# Patient Record
Sex: MALE | Race: WHITE | NOT HISPANIC OR LATINO | URBAN - METROPOLITAN AREA
[De-identification: names, ages, dates, MRNs, and addresses within clinical notes are randomized per-mention and may not be internally consistent; named-entity substitution may affect disease eponyms.]

---

## 2023-06-29 ENCOUNTER — EMERGENCY (EMERGENCY)
Facility: HOSPITAL | Age: 38
LOS: 1 days | Discharge: HOME | End: 2023-06-29
Attending: EMERGENCY MEDICINE | Admitting: EMERGENCY MEDICINE
Payer: MEDICAID

## 2023-06-29 VITALS
OXYGEN SATURATION: 100 % | DIASTOLIC BLOOD PRESSURE: 70 MMHG | RESPIRATION RATE: 22 BRPM | SYSTOLIC BLOOD PRESSURE: 144 MMHG | TEMPERATURE: 98 F | HEART RATE: 120 BPM | WEIGHT: 160.06 LBS

## 2023-06-29 VITALS
HEART RATE: 85 BPM | TEMPERATURE: 98 F | SYSTOLIC BLOOD PRESSURE: 112 MMHG | RESPIRATION RATE: 16 BRPM | OXYGEN SATURATION: 99 % | DIASTOLIC BLOOD PRESSURE: 70 MMHG

## 2023-06-29 LAB
ALBUMIN SERPL ELPH-MCNC: 4.2 G/DL — SIGNIFICANT CHANGE UP (ref 3.3–5)
ALP SERPL-CCNC: 68 U/L — SIGNIFICANT CHANGE UP (ref 40–120)
ALT FLD-CCNC: 23 U/L — SIGNIFICANT CHANGE UP (ref 12–78)
ANION GAP SERPL CALC-SCNC: 11 MMOL/L — SIGNIFICANT CHANGE UP (ref 5–17)
APAP SERPL-MCNC: <2 UG/ML — SIGNIFICANT CHANGE UP (ref 10–30)
AST SERPL-CCNC: 13 U/L — LOW (ref 15–37)
BASOPHILS # BLD AUTO: 0.04 K/UL — SIGNIFICANT CHANGE UP (ref 0–0.2)
BASOPHILS NFR BLD AUTO: 0.6 % — SIGNIFICANT CHANGE UP (ref 0–2)
BILIRUB SERPL-MCNC: 0.5 MG/DL — SIGNIFICANT CHANGE UP (ref 0.2–1.2)
BUN SERPL-MCNC: 11 MG/DL — SIGNIFICANT CHANGE UP (ref 7–23)
CALCIUM SERPL-MCNC: 9.7 MG/DL — SIGNIFICANT CHANGE UP (ref 8.5–10.1)
CHLORIDE SERPL-SCNC: 106 MMOL/L — SIGNIFICANT CHANGE UP (ref 96–108)
CO2 SERPL-SCNC: 24 MMOL/L — SIGNIFICANT CHANGE UP (ref 22–31)
CREAT SERPL-MCNC: 1.2 MG/DL — SIGNIFICANT CHANGE UP (ref 0.5–1.3)
D DIMER BLD IA.RAPID-MCNC: <150 NG/ML DDU — SIGNIFICANT CHANGE UP
EGFR: 79 ML/MIN/1.73M2 — SIGNIFICANT CHANGE UP
EOSINOPHIL # BLD AUTO: 0.08 K/UL — SIGNIFICANT CHANGE UP (ref 0–0.5)
EOSINOPHIL NFR BLD AUTO: 1.1 % — SIGNIFICANT CHANGE UP (ref 0–6)
GLUCOSE SERPL-MCNC: 117 MG/DL — HIGH (ref 70–99)
HCT VFR BLD CALC: 44.1 % — SIGNIFICANT CHANGE UP (ref 39–50)
HGB BLD-MCNC: 15.4 G/DL — SIGNIFICANT CHANGE UP (ref 13–17)
IMM GRANULOCYTES NFR BLD AUTO: 0.3 % — SIGNIFICANT CHANGE UP (ref 0–0.9)
LYMPHOCYTES # BLD AUTO: 1.78 K/UL — SIGNIFICANT CHANGE UP (ref 1–3.3)
LYMPHOCYTES # BLD AUTO: 25.4 % — SIGNIFICANT CHANGE UP (ref 13–44)
MCHC RBC-ENTMCNC: 29.8 PG — SIGNIFICANT CHANGE UP (ref 27–34)
MCHC RBC-ENTMCNC: 34.9 GM/DL — SIGNIFICANT CHANGE UP (ref 32–36)
MCV RBC AUTO: 85.3 FL — SIGNIFICANT CHANGE UP (ref 80–100)
MONOCYTES # BLD AUTO: 0.45 K/UL — SIGNIFICANT CHANGE UP (ref 0–0.9)
MONOCYTES NFR BLD AUTO: 6.4 % — SIGNIFICANT CHANGE UP (ref 2–14)
NEUTROPHILS # BLD AUTO: 4.65 K/UL — SIGNIFICANT CHANGE UP (ref 1.8–7.4)
NEUTROPHILS NFR BLD AUTO: 66.2 % — SIGNIFICANT CHANGE UP (ref 43–77)
NRBC # BLD: 0 /100 WBCS — SIGNIFICANT CHANGE UP (ref 0–0)
PCP SPEC-MCNC: SIGNIFICANT CHANGE UP
PLATELET # BLD AUTO: 269 K/UL — SIGNIFICANT CHANGE UP (ref 150–400)
POTASSIUM SERPL-MCNC: 3.6 MMOL/L — SIGNIFICANT CHANGE UP (ref 3.5–5.3)
POTASSIUM SERPL-SCNC: 3.6 MMOL/L — SIGNIFICANT CHANGE UP (ref 3.5–5.3)
PROT SERPL-MCNC: 8.1 G/DL — SIGNIFICANT CHANGE UP (ref 6–8.3)
RBC # BLD: 5.17 M/UL — SIGNIFICANT CHANGE UP (ref 4.2–5.8)
RBC # FLD: 11.8 % — SIGNIFICANT CHANGE UP (ref 10.3–14.5)
SALICYLATES SERPL-MCNC: <1.7 MG/DL — LOW (ref 2.8–20)
SODIUM SERPL-SCNC: 141 MMOL/L — SIGNIFICANT CHANGE UP (ref 135–145)
TROPONIN I, HIGH SENSITIVITY RESULT: 3 NG/L — SIGNIFICANT CHANGE UP
TSH SERPL-MCNC: 2.18 UIU/ML — SIGNIFICANT CHANGE UP (ref 0.36–3.74)
WBC # BLD: 7.02 K/UL — SIGNIFICANT CHANGE UP (ref 3.8–10.5)
WBC # FLD AUTO: 7.02 K/UL — SIGNIFICANT CHANGE UP (ref 3.8–10.5)

## 2023-06-29 PROCEDURE — 84443 ASSAY THYROID STIM HORMONE: CPT

## 2023-06-29 PROCEDURE — 36415 COLL VENOUS BLD VENIPUNCTURE: CPT

## 2023-06-29 PROCEDURE — 71045 X-RAY EXAM CHEST 1 VIEW: CPT | Mod: 26

## 2023-06-29 PROCEDURE — 99285 EMERGENCY DEPT VISIT HI MDM: CPT

## 2023-06-29 PROCEDURE — 96361 HYDRATE IV INFUSION ADD-ON: CPT

## 2023-06-29 PROCEDURE — 85025 COMPLETE CBC W/AUTO DIFF WBC: CPT

## 2023-06-29 PROCEDURE — 84484 ASSAY OF TROPONIN QUANT: CPT

## 2023-06-29 PROCEDURE — 80053 COMPREHEN METABOLIC PANEL: CPT

## 2023-06-29 PROCEDURE — 85379 FIBRIN DEGRADATION QUANT: CPT

## 2023-06-29 PROCEDURE — 93010 ELECTROCARDIOGRAM REPORT: CPT

## 2023-06-29 PROCEDURE — 96374 THER/PROPH/DIAG INJ IV PUSH: CPT

## 2023-06-29 PROCEDURE — 99285 EMERGENCY DEPT VISIT HI MDM: CPT | Mod: 25

## 2023-06-29 PROCEDURE — 71045 X-RAY EXAM CHEST 1 VIEW: CPT

## 2023-06-29 PROCEDURE — 80307 DRUG TEST PRSMV CHEM ANLYZR: CPT

## 2023-06-29 PROCEDURE — 93005 ELECTROCARDIOGRAM TRACING: CPT

## 2023-06-29 RX ORDER — ALPRAZOLAM 0.25 MG
1 TABLET ORAL
Qty: 9 | Refills: 0
Start: 2023-06-29 | End: 2023-07-01

## 2023-06-29 RX ORDER — SODIUM CHLORIDE 9 MG/ML
1000 INJECTION INTRAMUSCULAR; INTRAVENOUS; SUBCUTANEOUS ONCE
Refills: 0 | Status: COMPLETED | OUTPATIENT
Start: 2023-06-29 | End: 2023-06-29

## 2023-06-29 RX ADMIN — SODIUM CHLORIDE 1000 MILLILITER(S): 9 INJECTION INTRAMUSCULAR; INTRAVENOUS; SUBCUTANEOUS at 11:12

## 2023-06-29 RX ADMIN — Medication 1 MILLIGRAM(S): at 11:13

## 2023-06-29 RX ADMIN — SODIUM CHLORIDE 1000 MILLILITER(S): 9 INJECTION INTRAMUSCULAR; INTRAVENOUS; SUBCUTANEOUS at 12:30

## 2023-06-29 NOTE — ED ADULT NURSE NOTE - OBJECTIVE STATEMENT
Patient is 39yo M presents with c/o sensation of panic attack, reports this has happened to him once one year ago. Patient c/o heart racing, shortness of breath, sensation of tingling in B/L hands and feet, B/L restless legs, reports "I can feel the insides of my body functioning." Patient denies SI/HI/AVH, mother at bedside. Patient reports occasional Adderall use, last used yesterday, denies other drugs or alcohol.

## 2023-06-29 NOTE — ED PROVIDER NOTE - DIFFERENTIAL DIAGNOSIS
Differential including but not limited to A-fib SVT or other arrhythmia MI PE  hyperthyroidism withdrawal anemia electrolyte abnormality dehydration anxiety Differential Diagnosis

## 2023-06-29 NOTE — ED ADULT TRIAGE NOTE - SOURCE OF INFORMATION
Patient called into the office on 12/23 2020 at 3:24pm to review sleep study results. Please call patient back at [phone]. Patient

## 2023-06-29 NOTE — ED ADULT NURSE NOTE - NSFALLUNIVINTERV_ED_ALL_ED
Bed/Stretcher in lowest position, wheels locked, appropriate side rails in place/Call bell, personal items and telephone in reach/Instruct patient to call for assistance before getting out of bed/chair/stretcher/Non-slip footwear applied when patient is off stretcher/[place] to call system/Physically safe environment - no spills, clutter or unnecessary equipment/Purposeful proactive rounding/Room/bathroom lighting operational, light cord in reach

## 2023-06-29 NOTE — ED PROVIDER NOTE - CARE PROVIDER_API CALL
Dimitry Sparks  Family Medicine  [address] [place] [address], [facility] [address]  [place] [address]  Phone: [phone]  Fax: [phone]  Follow Up Time:

## 2023-06-29 NOTE — ED PROVIDER NOTE - NSFOLLOWUPCLINICS_GEN_ALL_ED_FT
[facility]  Psychiatry  [address] [place] [address]  Phone: [phone]  Fax:

## 2023-06-29 NOTE — ED PROVIDER NOTE - CLINICAL SUMMARY MEDICAL DECISION MAKING FREE TEXT BOX
Patient presenting to ER complaining of panic attack-like symptoms which have worsened today.  Patient relates 1 year ago he had a panic attack which was significant enough that he ended up being seen in the ER.  Since that time he has had intermittent mild episodes that he has not sought medical attention for.  Patient relates abusing Adderall which he gets on the streets however he does not use daily and whereas in the past he would use up to 90 mg a day he now uses between 5 and 10 mg if he does use.  Patient relates last Adderall use yesterday.  Patient denies any other alcohol or drug abuse in the past month.  Patient reports he does drink coffee by no energy drinks.  Patient does not see a psychiatrist or therapist.  Patient does report some chest tightness and palpitations.  No fevers chills headache shortness of breath abdominal pain vomiting edema calf pain travel.  No history of PE DVT.  No suicidal or homicidal ideation.    Plan EKG chest x-ray labs including CBC CMP D-dimer TSH troponin alcohol acetaminophen salicylate urine drug screen IV fluids Ativan    Differential including but not limited to A-fib SVT or other arrhythmia MI PE  hyperthyroidism withdrawal anemia electrolyte abnormality dehydration anxiety

## 2023-06-29 NOTE — ED PROVIDER NOTE - PATIENT PORTAL LINK FT
You can access the FollowMyHealth Patient Portal offered by [facility] by registering at the following website: [email or web address]. By joining [employer]’s FollowMyHealth portal, you will also be able to view your health information using other applications (apps) compatible with our system.

## 2023-06-29 NOTE — ED PROVIDER NOTE - NS ED ATTENDING STATEMENT MOD
This was a shared visit with the RACIEL. I reviewed and verified the documentation and independently performed the documented:

## 2023-06-29 NOTE — ED ADULT NURSE NOTE - GROOMING
Chief Complaint   Patient presents with     RECHECK     recurrent UTI's       Lisa Cristobal MA  
Good

## 2023-06-29 NOTE — ED PROVIDER NOTE - OBJECTIVE STATEMENT
Patient is a 38-year-old male with no past medical history coming in complaining of panic attack symptoms which worsened today.  Patient states he had a panic attack about a year ago which he ended up going to the hospital for is having minor attacks since then but has not sought any medical attention for.  Patient admits to using Adderall but not daily last used yesterday about 10 mg.  Patient currently denies any other alcohol or drugs.  Patient denies any suicidal homicidal ideation hallucinations.  Patient states he does drink coffee but does not drink any extra today and no other energy drinks.  Patient complaining of chest tightness or shortness of breath feels like his nerves are red and feels tingling in his arms and feet. Pt denies recent travels calf swelling or pain. Pt is not currently seeing any physiatrist or therapist. Patient is a 38-year-old male with no past medical history coming in complaining of panic attack symptoms which worsened today.  Patient states he had a panic attack about a year ago which he ended up going to the hospital for is having minor attacks since then but has not sought any medical attention for.  Patient admits to using Adderall but not daily last used yesterday about 10 mg.  Patient currently denies any other alcohol or drugs.  Patient denies any suicidal homicidal ideation hallucinations.  Patient states he does drink coffee but does not drink any extra today and no other energy drinks.  Patient complaining of chest tightness no shortness of breath feels like his nerves are red and feels tingling in his arms and feet. Pt denies recent travels calf swelling or pain. Pt is not currently seeing any physiatrist or therapist.

## 2023-06-29 NOTE — ED PROVIDER NOTE - NSFOLLOWUPINSTRUCTIONS_ED_ALL_ED_FT
Follow up with pcp  return to er for any worsening symptoms     Managing Anxiety, Adult  After being diagnosed with anxiety, you may be relieved to know why you have felt or behaved a certain way. You may also feel overwhelmed about the treatment ahead and what it will mean for your life. With care and support, you can manage this condition.    How to manage lifestyle changes  Managing stress and anxiety    An adult doing mindful breathing while practicing yoga.  Stress is your body's reaction to life changes and events, both good and bad. Most stress will last just a few hours, but stress can be ongoing and can lead to more than just stress. Although stress can play a major role in anxiety, it is not the same as anxiety. Stress is usually caused by something external, such as a deadline, test, or competition. Stress normally passes after the triggering event has ended.    Anxiety is caused by something internal, such as imagining a terrible outcome or worrying that something will go wrong that will devastate you. Anxiety often does not go away even after the triggering event is over, and it can become long-term (chronic) worry. It is important to understand the differences between stress and anxiety and to manage your stress effectively so that it does not lead to an anxious response.    Talk with your health care provider or a counselor to learn more about reducing anxiety and stress. He or she may suggest tension reduction techniques, such as:  Music therapy. Spend time creating or listening to music that you enjoy and that inspires you.  Mindfulness-based meditation. Practice being aware of your normal breaths while not trying to control your breathing. It can be done while sitting or walking.  Centering prayer. This involves focusing on a word, phrase, or sacred image that means something to you and brings you peace.  Deep breathing. To do this, expand your stomach and inhale slowly through your nose. Hold your breath for 3–5 seconds. Then exhale slowly, letting your stomach muscles relax.  Self-talk. Learn to notice and identify thought patterns that lead to anxiety reactions and change those patterns to thoughts that feel peaceful.  Muscle relaxation. Taking time to tense muscles and then relax them.  Choose a tension reduction technique that fits your lifestyle and personality. These techniques take time and practice. Set aside 5–15 minutes a day to do them. Therapists can offer counseling and training in these techniques. The training to help with anxiety may be covered by some insurance plans.    Other things you can do to manage stress and anxiety include:  Keeping a stress diary. This can help you learn what triggers your reaction and then learn ways to manage your response.  Thinking about how you react to certain situations. You may not be able to control everything, but you can control your response.  Making time for activities that help you relax and not feeling guilty about spending your time in this way.  Doing visual imagery. This involves imagining or creating mental pictures to help you relax.  Practicing yoga. Through yoga poses, you can lower tension and promote relaxation.  Medicines    Medicines can help ease symptoms. Medicines for anxiety include:  Antidepressant medicines. These are usually prescribed for long-term daily control.  Anti-anxiety medicines. These may be added in severe cases, especially when panic attacks occur.  Medicines will be prescribed by a health care provider. When used together, medicines, psychotherapy, and tension reduction techniques may be the most effective treatment.    Relationships    Relationships can play a big part in helping you recover. Try to spend more time connecting with trusted friends and family members.  Consider going to couples counseling if you have a partner, taking family education classes, or going to family therapy.  Therapy can help you and others better understand your condition.  How to recognize changes in your anxiety  Everyone responds differently to treatment for anxiety. Recovery from anxiety happens when symptoms decrease and stop interfering with your daily activities at home or work. This may mean that you will start to:  Have better concentration and focus. Worry will interfere less in your daily thinking.  Sleep better.  Be less irritable.  Have more energy.  Have improved memory.  It is also important to recognize when your condition is getting worse. Contact your health care provider if your symptoms interfere with home or work and you feel like your condition is not improving.    Follow these instructions at home:  Activity    Exercise. Adults should do the following:  Exercise for at least 150 minutes each week. The exercise should increase your heart rate and make you sweat (moderate-intensity exercise).  Strengthening exercises at least twice a week.  Get the right amount and quality of sleep. Most adults need 7–9 hours of sleep each night.  Lifestyle    Two adults cook together in a kitchen. Fruit and vegetables are on the counter in front of them.  Eat a healthy diet that includes plenty of vegetables, fruits, whole grains, low-fat dairy products, and lean protein.  Do not eat a lot of foods that are high in fats, added sugars, or salt (sodium).  Make choices that simplify your life.  Do not use any products that contain nicotine or tobacco. These products include cigarettes, chewing tobacco, and vaping devices, such as e-cigarettes. If you need help quitting, ask your health care provider.  Avoid caffeine, alcohol, and certain over-the-counter cold medicines. These may make you feel worse. Ask your pharmacist which medicines to avoid.  General instructions    Take over-the-counter and prescription medicines only as told by your health care provider.  Keep all follow-up visits. This is important.  Where to find support  You can get help and support from these sources:  Self-help groups.  Online and community organizations.  A trusted spiritual leader.  Couples counseling.  Family education classes.  Family therapy.  Where to find more information  You may find that joining a support group helps you deal with your anxiety. The following sources can help you locate counselors or support groups near you:  Mental Health [place]: www.mentalhealthamerica.net  Anxiety and Depression Association of [place] (ADAA): www.adaa.org  National [place] on Mental Illness ([place]): www.kimberley.org  Contact a health care provider if:  You have a hard time staying focused or finishing daily tasks.  You spend many hours a day feeling worried about everyday life.  You become exhausted by worry.  You start to have headaches or frequently feel tense.  You develop chronic nausea or diarrhea.  Get help right away if:  You have a racing heart and shortness of breath.  You have thoughts of hurting yourself or others.  If you ever feel like you may hurt yourself or others, or have thoughts about taking your own life, get help right away. Go to your nearest emergency department or:  Call your local emergency services (971 in the [place]  Call a suicide crisis helpline, such as the National Suicide Prevention Lifeline at 1-146.985.8078 or 221 in the [place] This is open 24 hours a day in the [place]  Text the Crisis Text Line at 485956 (in the [place]  Summary  Taking steps to learn and use tension reduction techniques can help calm you and help prevent triggering an anxiety reaction.  When used together, medicines, psychotherapy, and tension reduction techniques may be the most effective treatment.  Family, friends, and partners can play a big part in supporting you.  This information is not intended to replace advice given to you by your health care provider. Make sure you discuss any questions you have with your health care provider.

## 2024-02-20 ENCOUNTER — EMERGENCY (EMERGENCY)
Facility: HOSPITAL | Age: 39
LOS: 1 days | Discharge: HOME | End: 2024-02-20
Attending: EMERGENCY MEDICINE | Admitting: EMERGENCY MEDICINE
Payer: MEDICAID

## 2024-02-20 VITALS
HEART RATE: 76 BPM | SYSTOLIC BLOOD PRESSURE: 127 MMHG | HEIGHT: 69 IN | DIASTOLIC BLOOD PRESSURE: 81 MMHG | OXYGEN SATURATION: 98 % | TEMPERATURE: 98 F | WEIGHT: 160.06 LBS | RESPIRATION RATE: 16 BRPM

## 2024-02-20 PROCEDURE — 99285 EMERGENCY DEPT VISIT HI MDM: CPT

## 2024-02-20 RX ORDER — CLONAZEPAM 1 MG
0.5 TABLET ORAL ONCE
Refills: 0 | Status: DISCONTINUED | OUTPATIENT
Start: 2024-02-20 | End: 2024-02-20

## 2024-02-20 RX ORDER — SODIUM CHLORIDE 9 MG/ML
1000 INJECTION INTRAMUSCULAR; INTRAVENOUS; SUBCUTANEOUS ONCE
Refills: 0 | Status: COMPLETED | OUTPATIENT
Start: 2024-02-20 | End: 2024-02-20

## 2024-02-20 RX ADMIN — SODIUM CHLORIDE 1000 MILLILITER(S): 9 INJECTION INTRAMUSCULAR; INTRAVENOUS; SUBCUTANEOUS at 23:47

## 2024-02-20 RX ADMIN — Medication 0.5 MILLIGRAM(S): at 23:47

## 2024-02-20 NOTE — ED ADULT TRIAGE NOTE - CHIEF COMPLAINT QUOTE
pt reports he is having a panic attack, but not as bad as others he has had in the past. pt reports he drank coffee earlier. pt breathing unlabored in triage. pt denies SI/HI, states he feels safe at home.

## 2024-02-21 VITALS
HEART RATE: 69 BPM | SYSTOLIC BLOOD PRESSURE: 99 MMHG | OXYGEN SATURATION: 99 % | TEMPERATURE: 98 F | DIASTOLIC BLOOD PRESSURE: 68 MMHG | RESPIRATION RATE: 17 BRPM

## 2024-02-21 LAB
ALBUMIN SERPL ELPH-MCNC: 3.9 G/DL — SIGNIFICANT CHANGE UP (ref 3.3–5)
ALP SERPL-CCNC: 72 U/L — SIGNIFICANT CHANGE UP (ref 40–120)
ALT FLD-CCNC: 23 U/L — SIGNIFICANT CHANGE UP (ref 12–78)
ANION GAP SERPL CALC-SCNC: 5 MMOL/L — SIGNIFICANT CHANGE UP (ref 5–17)
AST SERPL-CCNC: 17 U/L — SIGNIFICANT CHANGE UP (ref 15–37)
BASOPHILS # BLD AUTO: 0.02 K/UL — SIGNIFICANT CHANGE UP (ref 0–0.2)
BASOPHILS NFR BLD AUTO: 0.2 % — SIGNIFICANT CHANGE UP (ref 0–2)
BILIRUB SERPL-MCNC: 0.3 MG/DL — SIGNIFICANT CHANGE UP (ref 0.2–1.2)
BUN SERPL-MCNC: 16 MG/DL — SIGNIFICANT CHANGE UP (ref 7–23)
CALCIUM SERPL-MCNC: 9.3 MG/DL — SIGNIFICANT CHANGE UP (ref 8.5–10.1)
CHLORIDE SERPL-SCNC: 105 MMOL/L — SIGNIFICANT CHANGE UP (ref 96–108)
CO2 SERPL-SCNC: 29 MMOL/L — SIGNIFICANT CHANGE UP (ref 22–31)
CREAT SERPL-MCNC: 0.93 MG/DL — SIGNIFICANT CHANGE UP (ref 0.5–1.3)
EGFR: 108 ML/MIN/1.73M2 — SIGNIFICANT CHANGE UP
EOSINOPHIL # BLD AUTO: 0.01 K/UL — SIGNIFICANT CHANGE UP (ref 0–0.5)
EOSINOPHIL NFR BLD AUTO: 0.1 % — SIGNIFICANT CHANGE UP (ref 0–6)
GLUCOSE SERPL-MCNC: 110 MG/DL — HIGH (ref 70–99)
HCT VFR BLD CALC: 45 % — SIGNIFICANT CHANGE UP (ref 39–50)
HGB BLD-MCNC: 14.9 G/DL — SIGNIFICANT CHANGE UP (ref 13–17)
IMM GRANULOCYTES NFR BLD AUTO: 0.2 % — SIGNIFICANT CHANGE UP (ref 0–0.9)
LIDOCAIN IGE QN: 43 U/L — SIGNIFICANT CHANGE UP (ref 13–75)
LYMPHOCYTES # BLD AUTO: 1.85 K/UL — SIGNIFICANT CHANGE UP (ref 1–3.3)
LYMPHOCYTES # BLD AUTO: 15.1 % — SIGNIFICANT CHANGE UP (ref 13–44)
MAGNESIUM SERPL-MCNC: 2.1 MG/DL — SIGNIFICANT CHANGE UP (ref 1.6–2.6)
MCHC RBC-ENTMCNC: 29.7 PG — SIGNIFICANT CHANGE UP (ref 27–34)
MCHC RBC-ENTMCNC: 33.1 GM/DL — SIGNIFICANT CHANGE UP (ref 32–36)
MCV RBC AUTO: 89.6 FL — SIGNIFICANT CHANGE UP (ref 80–100)
MONOCYTES # BLD AUTO: 0.35 K/UL — SIGNIFICANT CHANGE UP (ref 0–0.9)
MONOCYTES NFR BLD AUTO: 2.9 % — SIGNIFICANT CHANGE UP (ref 2–14)
NEUTROPHILS # BLD AUTO: 10.03 K/UL — HIGH (ref 1.8–7.4)
NEUTROPHILS NFR BLD AUTO: 81.5 % — HIGH (ref 43–77)
NRBC # BLD: 0 /100 WBCS — SIGNIFICANT CHANGE UP (ref 0–0)
PLATELET # BLD AUTO: 223 K/UL — SIGNIFICANT CHANGE UP (ref 150–400)
POTASSIUM SERPL-MCNC: 4.2 MMOL/L — SIGNIFICANT CHANGE UP (ref 3.5–5.3)
POTASSIUM SERPL-SCNC: 4.2 MMOL/L — SIGNIFICANT CHANGE UP (ref 3.5–5.3)
PROT SERPL-MCNC: 7.8 G/DL — SIGNIFICANT CHANGE UP (ref 6–8.3)
RBC # BLD: 5.02 M/UL — SIGNIFICANT CHANGE UP (ref 4.2–5.8)
RBC # FLD: 13.2 % — SIGNIFICANT CHANGE UP (ref 10.3–14.5)
SODIUM SERPL-SCNC: 139 MMOL/L — SIGNIFICANT CHANGE UP (ref 135–145)
TROPONIN I, HIGH SENSITIVITY RESULT: 3.5 NG/L — SIGNIFICANT CHANGE UP
TSH SERPL-MCNC: 1.03 UIU/ML — SIGNIFICANT CHANGE UP (ref 0.36–3.74)
WBC # BLD: 12.28 K/UL — HIGH (ref 3.8–10.5)
WBC # FLD AUTO: 12.28 K/UL — HIGH (ref 3.8–10.5)

## 2024-02-21 PROCEDURE — 99285 EMERGENCY DEPT VISIT HI MDM: CPT

## 2024-02-21 PROCEDURE — 84484 ASSAY OF TROPONIN QUANT: CPT

## 2024-02-21 PROCEDURE — 85025 COMPLETE CBC W/AUTO DIFF WBC: CPT

## 2024-02-21 PROCEDURE — 93010 ELECTROCARDIOGRAM REPORT: CPT

## 2024-02-21 PROCEDURE — 83690 ASSAY OF LIPASE: CPT

## 2024-02-21 PROCEDURE — 83735 ASSAY OF MAGNESIUM: CPT

## 2024-02-21 PROCEDURE — 93005 ELECTROCARDIOGRAM TRACING: CPT

## 2024-02-21 PROCEDURE — 80053 COMPREHEN METABOLIC PANEL: CPT

## 2024-02-21 PROCEDURE — 84443 ASSAY THYROID STIM HORMONE: CPT

## 2024-02-21 PROCEDURE — 36415 COLL VENOUS BLD VENIPUNCTURE: CPT

## 2024-02-21 PROCEDURE — 71045 X-RAY EXAM CHEST 1 VIEW: CPT

## 2024-02-21 PROCEDURE — 71045 X-RAY EXAM CHEST 1 VIEW: CPT | Mod: 26

## 2024-02-21 RX ORDER — CLONAZEPAM 1 MG
1 TABLET ORAL
Qty: 3 | Refills: 0
Start: 2024-02-21 | End: 2024-02-23

## 2024-02-21 NOTE — ED PROVIDER NOTE - OBJECTIVE STATEMENT
Patient is a 38-year-old gentleman who presents to the emergency room with a chief complaint of possible anxiety reaction.  Patient with no significant past medical history aside from anxiety.  Reports he is not on any medication.  Does admit to increased working hours with limited sleep.  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  Earlier today he had coffee and an over-the-counter supplement which has had before.  He reports it is basically a vitamin supplement.  He was laying on the couch when he had this weird sensation come over his whole body and had tingling in his arms and across his chest with lightheadedness.  Denies any headache vomiting cough or abdominal pain.  Did have some mild shortness of breath.  Spoke with his mother who is typically able to calm him down and although symptoms improved they did not resolve and so he came to the emergency room.  No fever.  Denies any suicidal homicidal ideation.

## 2024-02-21 NOTE — ED PROVIDER NOTE - NSFOLLOWUPCLINICS_GEN_ALL_ED_FT
[facility]  Psychiatry  [address] [place] [address]  Phone: [phone]  Fax:     [facility]  Psychiatry  [address] [place] [address]  Phone: [phone]  Fax:     [facility]  Psychiatry  [address] [place] [address]  Phone: [phone]  Fax:     [facility]  Psychiatry  [facility] [place] [address]  Phone: [phone]  Fax:

## 2024-02-21 NOTE — ED PROVIDER NOTE - NSFOLLOWUPCLINICSTOKEN_GEN_ALL_ED_FT
[id number]: || ||00\01||False;[id number]: || ||00\01||False;[id number]: || ||00\01||False;[id number]: || ||00\01||False;

## 2024-02-21 NOTE — ED PROVIDER NOTE - PATIENT PORTAL LINK FT
You can access the FollowMyHealth Patient Portal offered by [facility] by registering at the following website: [email or web address]. By joining [employer]’s FollowMyHealth portal, you will also be able to view your health information using other applications (apps) compatible with our system.

## 2024-02-21 NOTE — ED PROVIDER NOTE - NSFOLLOWUPINSTRUCTIONS_ED_ALL_ED_FT
Return to the ED for any new or worsening symptoms  Take your medication as prescribed  Klonopin as needed for extreme anxiety  Follow-up with nasal essential guidance call tomorrow  Advance activity as tolerated      Managing Anxiety, Adult  After being diagnosed with anxiety, you may be relieved to know why you have felt or behaved a certain way. You may also feel overwhelmed about the treatment ahead and what it will mean for your life. With care and support, you can manage your anxiety.    How to manage lifestyle changes  Understanding the difference between stress and anxiety    Although stress can play a role in anxiety, it is not the same as anxiety. Stress is your body's reaction to life changes and events, both good and bad. Stress is often caused by something external, such as a deadline, test, or competition. It normally goes away after the event has ended and will last just a few hours. But, stress can be ongoing and can lead to more than just stress.    Anxiety is caused by something internal, such as imagining a terrible outcome or worrying that something will go wrong that will greatly upset you. Anxiety often does not go away even after the event is over, and it can become a long-term (chronic) worry.    Lowering stress and anxiety    An adult doing mindful breathing while practicing yoga.  Talk with your health care provider or a counselor to learn more about lowering anxiety and stress. They may suggest tension-reduction techniques, such as:  Music. Spend time creating or listening to music that you enjoy and that inspires you.  Mindfulness-based meditation. Practice being aware of your normal breaths while not trying to control your breathing. It can be done while sitting or walking.  Centering prayer. Focus on a word, phrase, or sacred image that means something to you and brings you peace.  Deep breathing. Expand your stomach and inhale slowly through your nose. Hold your breath for 3–5 seconds. Then breathe out slowly, letting your stomach muscles relax.  Self-talk. Learn to notice and spot thought patterns that lead to anxiety reactions. Change those patterns to thoughts that feel peaceful.  Muscle relaxation. Take time to tense muscles and then relax them.  Choose a tension-reduction technique that fits your lifestyle and personality. These techniques take time and practice. Set aside 5–15 minutes a day to do them. Specialized therapists can offer counseling and training in these techniques. The training to help with anxiety may be covered by some insurance plans.    Other things you can do to manage stress and anxiety include:  Keeping a stress diary. This can help you learn what triggers your reaction and then learn ways to manage your response.  Thinking about how you react to certain situations. You may not be able to control everything, but you can control your response.  Making time for activities that help you relax and not feeling guilty about spending your time in this way.  Doing visual imagery. This involves imagining or creating mental pictures to help you relax.  Practicing yoga. Through yoga poses, you can lower tension and relax.  Medicines    Medicines for anxiety include:  Antidepressant medicines. These are usually prescribed for long-term daily control.  Anti-anxiety medicines. These may be added in severe cases, especially when panic attacks occur.  When used together, medicines, psychotherapy, and tension-reduction techniques may be the most effective treatment.    Relationships    Relationships can play a big part in helping you recover. Spend more time connecting with trusted friends and family members. Think about going to couples counseling if you have a partner, taking family education classes, or going to family therapy. Therapy can help you and others better understand your anxiety.    How to recognize changes in your anxiety  Everyone responds differently to treatment for anxiety. Recovery from anxiety happens when symptoms lessen and stop interfering with your daily life at home or work. This may mean that you will start to:  Have better concentration and focus. Worry will interfere less in your daily thinking.  Sleep better.  Be less irritable.  Have more energy.  Have improved memory.  Try to recognize when your condition is getting worse. Contact your provider if your symptoms interfere with home or work and you feel like your condition is not improving.    Follow these instructions at home:  Activity    Exercise. Adults should:  Exercise for at least 150 minutes each week. The exercise should increase your heart rate and make you sweat (moderate-intensity exercise).  Do strengthening exercises at least twice a week.  Get the right amount and quality of sleep. Most adults need 7–9 hours of sleep each night.  Lifestyle    Two adults cooking together in a kitchen. Fruit and vegetables are on the counter in front of them.  Eat a healthy diet that includes plenty of vegetables, fruits, whole grains, low-fat dairy products, and lean protein.  Do not eat a lot of foods that are high in fats, added sugars, or salt (sodium).  Make choices that simplify your life.  Do not use any products that contain nicotine or tobacco. These products include cigarettes, chewing tobacco, and vaping devices, such as e-cigarettes. If you need help quitting, ask your provider.  Avoid caffeine, alcohol, and certain over-the-counter cold medicines. These may make you feel worse. Ask your pharmacist which medicines to avoid.  General instructions    Take over-the-counter and prescription medicines only as told by your provider.  Keep all follow-up visits. This is to make sure you are managing your anxiety well or if you need more support.  Where to find support  You can get help and support from:  Self-help groups.  Online and community organizations.  A trusted spiritual leader.  Couples counseling.  Family education classes.  Family therapy.  Where to find more information  You may find that joining a support group helps you deal with your anxiety. The following sources can help you find counselors or support groups near you:  Mental Health [place]: mentalhealth[place].net  Anxiety and Depression Association of [place] (ADAA): adaa.org  National [place] on Mental Illness ([place]): kimberley.org  Contact a health care provider if:  You have a hard time staying focused or finishing tasks.  You spend many hours a day feeling worried about everyday life.  You are very tired because you cannot stop worrying.  You start to have headaches or often feel tense.  You have chronic nausea or diarrhea.  Get help right away if:  Your heart feels like it is racing.  You have shortness of breath.  You have thoughts of hurting yourself or others.  Get help right away if you feel like you may hurt yourself or others, or have thoughts about taking your own life. Go to your nearest emergency room or:  Call 371.  Call the National Suicide Prevention Lifeline at 1-778.536.9070 or 679. This is open 24 hours a day.  Text the Crisis Text Line at 803538.  This information is not intended to replace advice given to you by your health care provider. Make sure you discuss any questions you have with your health care provider.

## 2024-02-21 NOTE — ED ADULT NURSE NOTE - OBJECTIVE STATEMENT
pt a/o x 4 with an anxious affect c/o having a panic attack, which he feels better now.  pt adds that he has a history of panic attacks, asking what he can take to prevent them in the future.  pt denies chest pains or difficulty breathing

## 2024-02-21 NOTE — ED PROVIDER NOTE - CLINICAL SUMMARY MEDICAL DECISION MAKING FREE TEXT BOX
Results of labs reviewed no significant abnormality troponin within normal back within normal thyroid within normal.  Chest x-ray with no acute abnormality.  EKG normal sinus rhythm at 66 bpm.  Patient resting comfortably stable for discharge copy of outpatient referrals provided Patient is a 38-year-old gentleman who presents to the emergency room with a chief complaint of possible anxiety reaction.  Patient with no significant past medical history aside from anxiety.  Reports he is not on any medication.  Does admit to increased working hours with limited sleep.  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  Earlier today he had coffee and an over-the-counter supplement which has had before.  He reports it is basically a vitamin supplement.  He was laying on the couch when he had this weird sensation come over his whole body and had tingling in his arms and across his chest with lightheadedness.  Denies any headache vomiting cough or abdominal pain.  Did have some mild shortness of breath.  Spoke with his mother who is typically able to calm him down and although symptoms improved they did not resolve and so he came to the emergency room.  No fever.  Denies any suicidal homicidal ideation. Results of labs reviewed no significant abnormality troponin within normal back within normal thyroid within normal.  Chest x-ray with no acute abnormality.  EKG normal sinus rhythm at 66 bpm.  Patient resting comfortably stable for discharge copy of outpatient referrals provided

## 2024-02-21 NOTE — ED PROVIDER NOTE - DIFFERENTIAL DIAGNOSIS
Differential Diagnosis Patient presenting to the emergency room with possible anxiety reaction chest tightness and shortness of breath.  Differential includes possible anxiety reaction versus acute pulmonary process.  Will obtain screening labs EKG chest x-ray will hydrate medicate as needed for symptoms and monitor.  Mother at bedside.  Patient is currently not suicidal or homicidal.

## 2024-02-27 ENCOUNTER — EMERGENCY (EMERGENCY)
Facility: HOSPITAL | Age: 39
LOS: 1 days | Discharge: HOME | End: 2024-02-27
Attending: EMERGENCY MEDICINE | Admitting: EMERGENCY MEDICINE
Payer: MEDICAID

## 2024-02-27 VITALS
HEART RATE: 89 BPM | SYSTOLIC BLOOD PRESSURE: 119 MMHG | RESPIRATION RATE: 16 BRPM | TEMPERATURE: 98 F | WEIGHT: 160.06 LBS | HEIGHT: 69 IN | DIASTOLIC BLOOD PRESSURE: 76 MMHG | OXYGEN SATURATION: 97 %

## 2024-02-27 PROCEDURE — 99284 EMERGENCY DEPT VISIT MOD MDM: CPT

## 2024-02-27 PROCEDURE — 99283 EMERGENCY DEPT VISIT LOW MDM: CPT

## 2024-02-27 RX ORDER — DIAZEPAM 5 MG
1 TABLET ORAL
Qty: 4 | Refills: 0
Start: 2024-02-27 | End: 2024-03-01

## 2024-02-27 RX ORDER — DIAZEPAM 5 MG
5 TABLET ORAL ONCE
Refills: 0 | Status: DISCONTINUED | OUTPATIENT
Start: 2024-02-27 | End: 2024-02-27

## 2024-02-27 RX ADMIN — Medication 5 MILLIGRAM(S): at 08:04

## 2024-02-27 NOTE — ED ADULT NURSE NOTE - OBJECTIVE STATEMENT
the patient presents to the er with complaints of a panic attack.  he states that he was here last week for same.  He saw his PMD after his last er visit, hoping to get some sort of medication that when he feels an attack coming on, he can take it and "bring him down"- not a narcotic.  His PMD was not willing to do that but prescribed Lexapro, which he took for the first time today at 0515.  He states about 15 minutes after taking the pill, he developed the onset of a panic attack, where his arms went numb / cold and his anxiety went up.  He went through some home remedies, push ups, shower (first hot, then cold), green tea, etc.  Although he is not at a 10, he did not want to wait for that to happen.  He has a vitrual appt today with a therapist at 11am, but felt he could not wait.  He does admit to some recreational drug use in the past, but nothing recently.  He has not had coffee today.  Admits to vaping a few times per day, but has not today.  He denies any depression, denies any SI or HI.  he is alert / oriented x4, ambulates with a steady gait.  good strength in the upper and lower extremities.

## 2024-02-27 NOTE — ED ADULT NURSE NOTE - NSFALLUNIVINTERV_ED_ALL_ED
Bed/Stretcher in lowest position, wheels locked, appropriate side rails in place/Call bell, personal items and telephone in reach/Instruct patient to call for assistance before getting out of bed/chair/stretcher/Non-slip footwear applied when patient is off stretcher/[place] to call system/Physically safe environment - no spills, clutter or unnecessary equipment/Purposeful proactive rounding/Room/bathroom lighting operational, light cord in reach

## 2024-02-27 NOTE — ED PROVIDER NOTE - NSFOLLOWUPINSTRUCTIONS_ED_ALL_ED_FT
-- You should update your primary care physician on your Emergency Department visit and follow up with them.  If you do not have a physician or have difficulty following up, please call: [phone] to obtain a [facility] doctor or specialist who can provide follow up.    -- Return to the ER for worsening or persistent symptoms, and/or ANY NEW OR CONCERNING SYMPTOMS.

## 2024-02-27 NOTE — ED PROVIDER NOTE - OBJECTIVE STATEMENT
pt states that he has been having a problem with anxiety and this morning he was feeling panicked and thinks it was after taking his first dose of lexapro, pt comes to ER looking for some medication for anxiety. pt has a telehealth appt with psych later today.

## 2024-02-27 NOTE — ED ADULT NURSE NOTE - CHIEF COMPLAINT QUOTE
Pt rescheduled CPX with PCP on 9/7/22, pt will go to labs as walk in does not want to schedule an appt at this time, appt with Dr. Coombs on 8/26/22 was CX   felt like he was having a panic attack this morning and took prescribed lexapro for the first time, and states it made it worse.  patient is calm now but asking for a narcotic to get him through till Monday until he sees his PMD.  was notified during triage that his psychiatrist can virtually evaluate him later today

## 2024-02-27 NOTE — ED PROVIDER NOTE - PHYSICAL EXAMINATION
Gen: alert, NAD  HEENT:  NC/AT, PERR  CV:  well perfused  Pulm:  normal RR, breathing comfortably  MSK: moving all extremities  Neuro:  non-focal  Skin:  visualized areas intact  Psych: AOx3

## 2024-02-27 NOTE — ED ADULT TRIAGE NOTE - CHIEF COMPLAINT QUOTE
felt like he was having a panic attack this morning and took prescribed lexapro for the first time, and states it made it worse.  patient is calm now but asking for a narcotic to get him through till Monday until he sees his PMD.  was notified during triage that his psychiatrist can virtually evaluate him later today

## 2024-02-27 NOTE — ED PROVIDER NOTE - PATIENT PORTAL LINK FT
You can access the FollowMyHealth Patient Portal offered by [facility] by registering at the following website: [email or web address]. By joining [facility]’s FollowMyHealth portal, you will also be able to view your health information using other applications (apps) compatible with our system.